# Patient Record
Sex: MALE | HISPANIC OR LATINO | Employment: FULL TIME | URBAN - METROPOLITAN AREA
[De-identification: names, ages, dates, MRNs, and addresses within clinical notes are randomized per-mention and may not be internally consistent; named-entity substitution may affect disease eponyms.]

---

## 2017-08-23 ENCOUNTER — HOSPITAL ENCOUNTER (OUTPATIENT)
Dept: RADIOLOGY | Facility: MEDICAL CENTER | Age: 57
End: 2017-08-23
Attending: PHYSICIAN ASSISTANT
Payer: COMMERCIAL

## 2017-08-23 DIAGNOSIS — R05.9 COUGH: ICD-10-CM

## 2017-08-23 PROCEDURE — 71020 DX-CHEST-2 VIEWS: CPT

## 2018-03-07 ENCOUNTER — OFFICE VISIT (OUTPATIENT)
Dept: CARDIOLOGY | Facility: MEDICAL CENTER | Age: 58
End: 2018-03-07
Payer: COMMERCIAL

## 2018-03-07 VITALS
DIASTOLIC BLOOD PRESSURE: 98 MMHG | OXYGEN SATURATION: 94 % | BODY MASS INDEX: 38.21 KG/M2 | SYSTOLIC BLOOD PRESSURE: 158 MMHG | HEART RATE: 82 BPM | HEIGHT: 69 IN | WEIGHT: 258 LBS

## 2018-03-07 DIAGNOSIS — R07.9 CHEST PAIN, UNSPECIFIED TYPE: ICD-10-CM

## 2018-03-07 DIAGNOSIS — I10 HTN (HYPERTENSION), MALIGNANT: ICD-10-CM

## 2018-03-07 LAB — EKG IMPRESSION: NORMAL

## 2018-03-07 PROCEDURE — 99244 OFF/OP CNSLTJ NEW/EST MOD 40: CPT | Performed by: INTERNAL MEDICINE

## 2018-03-07 PROCEDURE — 93000 ELECTROCARDIOGRAM COMPLETE: CPT | Performed by: INTERNAL MEDICINE

## 2018-03-07 ASSESSMENT — ENCOUNTER SYMPTOMS
BLURRED VISION: 0
COUGH: 0
DEPRESSION: 0
LOSS OF CONSCIOUSNESS: 0
FEVER: 0
SHORTNESS OF BREATH: 0
VOMITING: 0
MYALGIAS: 0
NAUSEA: 0
CHILLS: 0
SENSORY CHANGE: 0
DOUBLE VISION: 0
SPEECH CHANGE: 0
CLAUDICATION: 0
EYE PAIN: 0
HEADACHES: 0
PND: 0
BRUISES/BLEEDS EASILY: 0
BLOOD IN STOOL: 0
ORTHOPNEA: 0
PALPITATIONS: 0
DIZZINESS: 0
EYE DISCHARGE: 0
HALLUCINATIONS: 0
ABDOMINAL PAIN: 0
FALLS: 0
WEIGHT LOSS: 0

## 2018-03-07 NOTE — PROGRESS NOTES
Subjective:   Danielito Marroquin is a 57 y.o. male who presents today for cardiac care and evaluation for chest pain. Patient has chest pain at sporadic times. No specific precipitating factors or worsening factors. Chest pain is described to be pressure-like sensation however localized at anterior chest without radition. Lasting for seconds to minutes. No prior cardiac problems. No prior cardiac workup.    No family history of sudden cardiac death.    Chief Complaint: chest pain.    I have reviewed patient's ECG, which shows normal sinus rhythm, normal OK, QT intervals. No evidence of acute coronary syndrome.    Past Medical History:   Diagnosis Date   • Hypertension      History reviewed. No pertinent surgical history.  Family History   Problem Relation Age of Onset   • Heart Disease Mother      History   Smoking Status   • Never Smoker   Smokeless Tobacco   • Never Used     No Known Allergies  Outpatient Encounter Prescriptions as of 3/7/2018   Medication Sig Dispense Refill   • aspirin EC (ECOTRIN) 81 MG Tablet Delayed Response Take 81 mg by mouth every day.       No facility-administered encounter medications on file as of 3/7/2018.      Review of Systems   Constitutional: Negative for chills, fever, malaise/fatigue and weight loss.   HENT: Negative for ear discharge, ear pain, hearing loss and nosebleeds.    Eyes: Negative for blurred vision, double vision, pain and discharge.   Respiratory: Negative for cough and shortness of breath.    Cardiovascular: Positive for chest pain. Negative for palpitations, orthopnea, claudication, leg swelling and PND.   Gastrointestinal: Negative for abdominal pain, blood in stool, melena, nausea and vomiting.   Genitourinary: Negative for dysuria and hematuria.   Musculoskeletal: Negative for falls, joint pain and myalgias.   Skin: Negative for itching and rash.   Neurological: Negative for dizziness, sensory change, speech change, loss of consciousness and headaches.  "  Endo/Heme/Allergies: Negative for environmental allergies. Does not bruise/bleed easily.   Psychiatric/Behavioral: Negative for depression, hallucinations and suicidal ideas.        Objective:   /98   Pulse 82   Ht 1.753 m (5' 9\")   Wt 117 kg (258 lb)   SpO2 94%   BMI 38.10 kg/m²     Physical Exam   Constitutional: He is oriented to person, place, and time. He appears well-developed and well-nourished.   HENT:   Head: Normocephalic and atraumatic.   Eyes: EOM are normal.   Neck: Normal range of motion. No JVD present.   Cardiovascular: Normal rate, regular rhythm, normal heart sounds and intact distal pulses.  Exam reveals no gallop and no friction rub.    No murmur heard.  Bilateral femoral pulses are 2+, bilateral dorsalis pedis pulses are 2+, bilateral posterior tibialis pulses are 2+.   Pulmonary/Chest: No respiratory distress. He has no wheezes. He has no rales. He exhibits no tenderness.   Abdominal: Soft. Bowel sounds are normal. There is no tenderness. There is no rebound and no guarding.   The is no presence of abdominal bruits   Musculoskeletal: Normal range of motion.   Neurological: He is alert and oriented to person, place, and time.   Skin: Skin is warm and dry.   Psychiatric: He has a normal mood and affect.   Nursing note and vitals reviewed.      Assessment:     1. Chest pain, unspecified type  EKG   2. HTN (hypertension), malignant         Medical Decision Making:  Today's Assessment / Status / Plan:   At this time, to further risk stratify, I will order a transthoracic echocardiogram to assess cardiac and valvular functions. I will also order a treadmill stress test (to avoid radiation exposure in young patients) to assess for coronary ischemia.          "

## 2018-03-14 ENCOUNTER — NON-PROVIDER VISIT (OUTPATIENT)
Dept: CARDIOLOGY | Facility: MEDICAL CENTER | Age: 58
End: 2018-03-14
Payer: COMMERCIAL

## 2018-03-14 VITALS
OXYGEN SATURATION: 97 % | WEIGHT: 258 LBS | DIASTOLIC BLOOD PRESSURE: 98 MMHG | HEART RATE: 70 BPM | HEIGHT: 69 IN | BODY MASS INDEX: 38.21 KG/M2 | SYSTOLIC BLOOD PRESSURE: 158 MMHG

## 2018-03-14 DIAGNOSIS — R07.89 OTHER CHEST PAIN: ICD-10-CM

## 2018-03-14 LAB — TREADMILL STRESS: NORMAL

## 2018-03-14 PROCEDURE — 93015 CV STRESS TEST SUPVJ I&R: CPT | Performed by: INTERNAL MEDICINE

## 2018-03-15 DIAGNOSIS — R07.89 OTHER CHEST PAIN: ICD-10-CM

## 2018-03-15 NOTE — PROGRESS NOTES
Dear Apple,    Can you please let Danieltio Marroquin know that result is ok and I will see patient as scheduled?    Thanks Musa Chiu.

## 2018-03-16 ENCOUNTER — TELEPHONE (OUTPATIENT)
Dept: CARDIOLOGY | Facility: MEDICAL CENTER | Age: 58
End: 2018-03-16

## 2018-03-16 NOTE — LETTER
March 16, 2018        Danielito Marroquin  [address] [facility] [address]          Dear Danielito,    We have received the results of your recent:    Treadmill Stress Test    Your test came back looking OK per Dr. Lindsay.  Please follow up as previously discussed on September 17, 2018 at 10:15am with Dr. Lindsay.     To schedule your echocardiogram that has been ordered, please call Imaging Scheduling at [phone].    Feel free to call us with any questions, [phone].        Sincerely,        [facility]

## 2018-03-16 NOTE — TELEPHONE ENCOUNTER
[facility] Treadmill Stress   Order: [id number]   Status:  Edited Result - FINAL   Visible to patient:  No (Not Released) Dx:  Other chest pain   Notes Recorded by Dayan Lindsay M.D. on 3/14/2018 at 5:19 PM PDT  Dear Apple,    Can you please let Danielito Marroquin know that result is ok and I will see patient as scheduled?    Thanks Musa Chiu.     _____________________________________    Contacted patient, discussed above note.  Patient is requesting this information be mailed to him.      He also is inquiring on echo that has been ordered.  Provided [phone] to patient to schedule study.      Patient verbalizes all understanding.

## 2018-04-05 ENCOUNTER — HOSPITAL ENCOUNTER (OUTPATIENT)
Dept: CARDIOLOGY | Facility: MEDICAL CENTER | Age: 58
End: 2018-04-05
Attending: INTERNAL MEDICINE
Payer: COMMERCIAL

## 2018-04-05 DIAGNOSIS — R07.89 OTHER CHEST PAIN: ICD-10-CM

## 2018-04-05 LAB
LV EJECT FRACT  99904: 50
LV EJECT FRACT MOD 2C 99903: 47.06
LV EJECT FRACT MOD 4C 99902: 54.15
LV EJECT FRACT MOD BP 99901: 51.29

## 2018-04-05 PROCEDURE — 93306 TTE W/DOPPLER COMPLETE: CPT

## 2018-04-05 PROCEDURE — 93306 TTE W/DOPPLER COMPLETE: CPT | Mod: 26 | Performed by: INTERNAL MEDICINE

## 2018-04-06 NOTE — PROGRESS NOTES
Dear Apple,    Can you please let Danielito Marroquin know that result is ok and I will see patient as scheduled?    Thanks Musa Chiu.

## 2018-04-09 ENCOUNTER — TELEPHONE (OUTPATIENT)
Dept: CARDIOLOGY | Facility: MEDICAL CENTER | Age: 58
End: 2018-04-09

## 2018-04-09 NOTE — TELEPHONE ENCOUNTER
Echocardiogram Comp w/o Cont   Order: [id number]   Status:  Final result   Visible to patient:  No (Not Released) Dx:  Other chest pain   Notes Recorded by Dayan Lindsay M.D. on 4/6/2018 at 11:23 AM PDT  Dear Apple,    Can you please let [place]ada know that result is ok and I will see patient as scheduled?    Thanks Musa Chiu.     ____________________________________    Contacted patient, discussed above note.  Reminded patient of F/V on 9/17/18.  Patient verbalizes understanding.

## 2021-01-04 ENCOUNTER — NURSE TRIAGE (OUTPATIENT)
Dept: HEALTH INFORMATION MANAGEMENT | Facility: OTHER | Age: 61
End: 2021-01-04

## 2021-01-04 NOTE — TELEPHONE ENCOUNTER
Pt & wife tested for covid on 12/22 & were +, his symptoms started on 12/20, wife was cleared by health department recently, he wants to be cleared to undergo lab work & x-ray.  His only symptoms was a cough.      He still has cough.      Cleared to get testing done.

## 2021-01-05 ENCOUNTER — HOSPITAL ENCOUNTER (OUTPATIENT)
Dept: RADIOLOGY | Facility: MEDICAL CENTER | Age: 61
End: 2021-01-05
Attending: FAMILY MEDICINE
Payer: COMMERCIAL

## 2021-01-05 ENCOUNTER — HOSPITAL ENCOUNTER (OUTPATIENT)
Dept: LAB | Facility: MEDICAL CENTER | Age: 61
End: 2021-01-05
Attending: FAMILY MEDICINE
Payer: COMMERCIAL

## 2021-01-05 DIAGNOSIS — U07.1 INFECTION DUE TO 2019-NCOV: ICD-10-CM

## 2021-01-05 DIAGNOSIS — J45.30 MILD PERSISTENT ASTHMA, UNCOMPLICATED: ICD-10-CM

## 2021-01-05 LAB
ALBUMIN SERPL BCP-MCNC: 4.1 G/DL (ref 3.2–4.9)
ALBUMIN/GLOB SERPL: 1.3 G/DL
ALP SERPL-CCNC: 59 U/L (ref 30–99)
ALT SERPL-CCNC: 116 U/L (ref 2–50)
ANION GAP SERPL CALC-SCNC: 11 MMOL/L (ref 7–16)
AST SERPL-CCNC: 99 U/L (ref 12–45)
BASOPHILS # BLD AUTO: 0.4 % (ref 0–1.8)
BASOPHILS # BLD: 0.03 K/UL (ref 0–0.12)
BILIRUB SERPL-MCNC: 0.5 MG/DL (ref 0.1–1.5)
BUN SERPL-MCNC: 11 MG/DL (ref 8–22)
CALCIUM SERPL-MCNC: 9.2 MG/DL (ref 8.5–10.5)
CHLORIDE SERPL-SCNC: 104 MMOL/L (ref 96–112)
CHOLEST SERPL-MCNC: 206 MG/DL (ref 100–199)
CO2 SERPL-SCNC: 26 MMOL/L (ref 20–33)
CREAT SERPL-MCNC: 0.78 MG/DL (ref 0.5–1.4)
EOSINOPHIL # BLD AUTO: 0.43 K/UL (ref 0–0.51)
EOSINOPHIL NFR BLD: 5.6 % (ref 0–6.9)
ERYTHROCYTE [DISTWIDTH] IN BLOOD BY AUTOMATED COUNT: 42.7 FL (ref 35.9–50)
FASTING STATUS PATIENT QL REPORTED: NORMAL
GLOBULIN SER CALC-MCNC: 3.1 G/DL (ref 1.9–3.5)
GLUCOSE SERPL-MCNC: 92 MG/DL (ref 65–99)
HCT VFR BLD AUTO: 45.5 % (ref 42–52)
HDLC SERPL-MCNC: 45 MG/DL
HGB BLD-MCNC: 15.3 G/DL (ref 14–18)
IMM GRANULOCYTES # BLD AUTO: 0.04 K/UL (ref 0–0.11)
IMM GRANULOCYTES NFR BLD AUTO: 0.5 % (ref 0–0.9)
LDLC SERPL CALC-MCNC: 141 MG/DL
LYMPHOCYTES # BLD AUTO: 2.7 K/UL (ref 1–4.8)
LYMPHOCYTES NFR BLD: 35.1 % (ref 22–41)
MCH RBC QN AUTO: 28.8 PG (ref 27–33)
MCHC RBC AUTO-ENTMCNC: 33.6 G/DL (ref 33.7–35.3)
MCV RBC AUTO: 85.5 FL (ref 81.4–97.8)
MONOCYTES # BLD AUTO: 0.52 K/UL (ref 0–0.85)
MONOCYTES NFR BLD AUTO: 6.8 % (ref 0–13.4)
NEUTROPHILS # BLD AUTO: 3.98 K/UL (ref 1.82–7.42)
NEUTROPHILS NFR BLD: 51.6 % (ref 44–72)
NRBC # BLD AUTO: 0 K/UL
NRBC BLD-RTO: 0 /100 WBC
PLATELET # BLD AUTO: 277 K/UL (ref 164–446)
PMV BLD AUTO: 10.8 FL (ref 9–12.9)
POTASSIUM SERPL-SCNC: 3.6 MMOL/L (ref 3.6–5.5)
PROT SERPL-MCNC: 7.2 G/DL (ref 6–8.2)
RBC # BLD AUTO: 5.32 M/UL (ref 4.7–6.1)
SODIUM SERPL-SCNC: 141 MMOL/L (ref 135–145)
TRIGL SERPL-MCNC: 99 MG/DL (ref 0–149)
WBC # BLD AUTO: 7.7 K/UL (ref 4.8–10.8)

## 2021-01-05 PROCEDURE — 80053 COMPREHEN METABOLIC PANEL: CPT

## 2021-01-05 PROCEDURE — 71046 X-RAY EXAM CHEST 2 VIEWS: CPT

## 2021-01-05 PROCEDURE — 36415 COLL VENOUS BLD VENIPUNCTURE: CPT

## 2021-01-05 PROCEDURE — 85025 COMPLETE CBC W/AUTO DIFF WBC: CPT

## 2021-01-05 PROCEDURE — 80061 LIPID PANEL: CPT

## 2023-04-20 ENCOUNTER — TELEPHONE (OUTPATIENT)
Dept: CARDIOLOGY | Facility: MEDICAL CENTER | Age: 63
End: 2023-04-20
Payer: COMMERCIAL

## 2023-04-20 NOTE — TELEPHONE ENCOUNTER
Received surgical clearance from [facility] for Colonoscopy  Patient has not been seen within 18 months.     Task sent to schedulers to schedule sooner appt for clearance if available

## 2023-04-25 ENCOUNTER — APPOINTMENT (OUTPATIENT)
Dept: CARDIOLOGY | Facility: MEDICAL CENTER | Age: 63
End: 2023-04-25
Payer: COMMERCIAL

## 2023-04-27 ENCOUNTER — TELEPHONE (OUTPATIENT)
Dept: CARDIOLOGY | Facility: PHYSICIAN GROUP | Age: 63
End: 2023-04-27
Payer: COMMERCIAL

## 2023-04-27 NOTE — TELEPHONE ENCOUNTER
Spoke to patient in regards to obtaining records for NP appointment with [facility]. Per patient has never been treated by a previous cardiologist. Confirmed all recent notes, labs, and cardiac imaging are in Epic. Confirmed with patient appointment time, location, and date.

## 2023-05-01 ENCOUNTER — OFFICE VISIT (OUTPATIENT)
Dept: CARDIOLOGY | Facility: MEDICAL CENTER | Age: 63
End: 2023-05-01
Payer: COMMERCIAL

## 2023-05-01 VITALS
RESPIRATION RATE: 16 BRPM | WEIGHT: 254 LBS | HEART RATE: 78 BPM | SYSTOLIC BLOOD PRESSURE: 130 MMHG | HEIGHT: 69 IN | DIASTOLIC BLOOD PRESSURE: 86 MMHG | OXYGEN SATURATION: 97 % | BODY MASS INDEX: 37.62 KG/M2

## 2023-05-01 DIAGNOSIS — E78.5 DYSLIPIDEMIA: ICD-10-CM

## 2023-05-01 DIAGNOSIS — I51.7 LVH (LEFT VENTRICULAR HYPERTROPHY): ICD-10-CM

## 2023-05-01 DIAGNOSIS — R07.89 OTHER CHEST PAIN: ICD-10-CM

## 2023-05-01 DIAGNOSIS — Z01.810 PREOPERATIVE CARDIOVASCULAR EXAMINATION: ICD-10-CM

## 2023-05-01 DIAGNOSIS — I10 ESSENTIAL HYPERTENSION: ICD-10-CM

## 2023-05-01 DIAGNOSIS — R94.31 ABNORMAL ECG: ICD-10-CM

## 2023-05-01 PROCEDURE — 99244 OFF/OP CNSLTJ NEW/EST MOD 40: CPT | Performed by: INTERNAL MEDICINE

## 2023-05-01 PROCEDURE — 93000 ELECTROCARDIOGRAM COMPLETE: CPT | Performed by: INTERNAL MEDICINE

## 2023-05-01 RX ORDER — LOSARTAN POTASSIUM 25 MG/1
25 TABLET ORAL 2 TIMES DAILY
COMMUNITY
Start: 2023-02-23

## 2023-05-01 RX ORDER — ATORVASTATIN CALCIUM 40 MG/1
TABLET, FILM COATED ORAL
COMMUNITY
Start: 2023-02-23

## 2023-05-01 RX ORDER — POLYETHYLENE GLYCOL-3350 AND ELECTROLYTES 236; 6.74; 5.86; 2.97; 22.74 G/274.31G; G/274.31G; G/274.31G; G/274.31G; G/274.31G
POWDER, FOR SOLUTION ORAL
COMMUNITY
Start: 2023-04-12 | End: 2023-05-01

## 2023-05-01 ASSESSMENT — ENCOUNTER SYMPTOMS
NUMBNESS: 1
BLOATING: 0
PARESTHESIAS: 1
DOUBLE VISION: 0
DYSPNEA ON EXERTION: 0
FLANK PAIN: 0
FALLS: 0
SHORTNESS OF BREATH: 0
EXCESSIVE DAYTIME SLEEPINESS: 0
DECREASED APPETITE: 0
DIARRHEA: 0
SYNCOPE: 0
MYALGIAS: 0
SLEEP DISTURBANCES DUE TO BREATHING: 0
CONSTIPATION: 0
NIGHT SWEATS: 0
PALPITATIONS: 0
FEVER: 0
PND: 0
WEAKNESS: 0
DIZZINESS: 0
HEADACHES: 0
LIGHT-HEADEDNESS: 0
IRREGULAR HEARTBEAT: 0
BACK PAIN: 0
VOMITING: 0
NEAR-SYNCOPE: 0
SORE THROAT: 0
LOSS OF BALANCE: 0
ORTHOPNEA: 0
NAUSEA: 0
BLURRED VISION: 0
COUGH: 0
DIAPHORESIS: 0
WHEEZING: 0

## 2023-05-01 NOTE — PROGRESS NOTES
Cardiology Initial Consultation Note    Date of note:    5/1/2023    Primary Care Provider: Galo Nesbitt M.D.  Referring Provider: Galo Nesbitt M.D.     Patient Name: Danielito Bray     YOB: 1960  MRN:              [id number]    Chief Complaint   Patient presents with    Chest Pain     NP Dx: Chest pain, unspecified       History of Present Illness: Mr. Danielito Marroquin is a 62 y.o. male whose current medical problems include hypertension, dyslipidemia and prediabetes who is here for cardiac consultation for chest pain and preoperative cardiovascular evaluation.    Patient states that he was at his [employer] last Sunday and had elevated BP in the 200s.  Has been checking his BP at home which has been labile.  Was diagnosed last year.  Was initially on losartan once daily, is now on twice daily.  Goes to [place] for work and will stay 2-3 weeks.  Has gained weight due to dietary indiscretion.    Has occasional chest pain, left sided, pressure like.  Usually with exertion.  At times, will get numbness in bilateral arms.    In terms of physical activity, is a .  Physically demanding job.  Symptoms usually are when working.    Cardiovascular Risk Factors:  1. Smoking status: Former smoker  2. Type II Diabetes Mellitus: Prediabetes  Lab Results   Component Value Date/Time    HBA1C 6.1 (H) 03/02/2016 01:21 PM     3. Hypertension: Yes  4. Dyslipidemia: Yes  Cholesterol,Tot   Date Value Ref Range Status   01/05/2021 206 (H) 100 - 199 mg/dL Final     LDL   Date Value Ref Range Status   01/05/2021 141 (H) <100 mg/dL Final     HDL   Date Value Ref Range Status   01/05/2021 45 >=40 mg/dL Final     Triglycerides   Date Value Ref Range Status   01/05/2021 99 0 - 149 mg/dL Final     5. Family history of early Coronary Artery Disease in a first degree relative (Male less than 55 years of age; Female less than 65 years of age): Denies  6.  Obesity and/or Metabolic Syndrome: BMI  37.5  7. Sedentary lifestyle: No      Review of Systems   Constitutional: Negative for decreased appetite, diaphoresis, fever, malaise/fatigue and night sweats.   HENT:  Negative for congestion and sore throat.    Eyes:  Negative for blurred vision and double vision.   Cardiovascular:  Positive for chest pain. Negative for cyanosis, dyspnea on exertion, irregular heartbeat, leg swelling, near-syncope, orthopnea, palpitations, paroxysmal nocturnal dyspnea and syncope.   Respiratory:  Negative for cough, shortness of breath, sleep disturbances due to breathing and wheezing.    Endocrine: Negative for cold intolerance and heat intolerance.   Musculoskeletal:  Negative for back pain, falls and myalgias.   Gastrointestinal:  Negative for bloating, constipation, diarrhea, nausea and vomiting.   Genitourinary:  Negative for dysuria and flank pain.   Neurological:  Positive for numbness and paresthesias. Negative for excessive daytime sleepiness, dizziness, headaches, light-headedness, loss of balance and weakness.       Past Medical History:   Diagnosis Date    Hypertension          History reviewed. No pertinent surgical history.      Current Outpatient Medications   Medication Sig Dispense Refill    atorvastatin (LIPITOR) 40 MG Tab TAKE 1 TABLET BY MOUTH ONCE DAILY FOR CHOLESTEROL      losartan (COZAAR) 25 MG Tab Take 25 mg by mouth 2 times a day.      aspirin EC (ECOTRIN) 81 MG Tablet Delayed Response Take 81 mg by mouth every day.       No current facility-administered medications for this visit.         No Known Allergies      Family History   Problem Relation Age of Onset    Heart Disease Mother          Social History     Socioeconomic History    Marital status:      Spouse name: Not on file    Number of children: Not on file    Years of education: Not on file    Highest education level: Not on file   Occupational History    Not on file   Tobacco Use    Smoking status: Former     Types: Cigarettes     "Smokeless tobacco: Never   Substance and Sexual Activity    Alcohol use: No    Drug use: No    Sexual activity: Not on file   Other Topics Concern    Not on file   Social History Narrative    Not on file     Social Determinants of Health     Financial Resource Strain: Not on file   Food Insecurity: Not on file   Transportation Needs: Not on file   Physical Activity: Not on file   Stress: Not on file   Social Connections: Not on file   Intimate Partner Violence: Not on file   Housing Stability: Not on file         Physical Exam:  Ambulatory Vitals  /86 (BP Location: Left arm, Patient Position: Sitting, BP Cuff Size: Adult)   Pulse 78   Resp 16   Ht 1.753 m (5' 9\")   Wt 115 kg (254 lb)   SpO2 97%    Oxygen Therapy:  Pulse Oximetry: 97 %  BP Readings from Last 4 Encounters:   05/01/23 130/86   03/14/18 158/98   03/07/18 158/98       Weight/BMI: Body mass index is 37.51 kg/m².  Wt Readings from Last 4 Encounters:   05/01/23 115 kg (254 lb)   03/14/18 117 kg (258 lb)   03/07/18 117 kg (258 lb)       General: Well appearing and in no apparent distress  Eyes: nl conjunctiva, no icteric sclera  ENT: wearing a mask, normal external appearance of ears  Neck: no visible JVP,  no carotid bruits  Lungs: normal respiratory effort, CTAB  Heart: RRR, no murmurs, no rubs or gallops,  no edema bilateral lower extremities. No LV/RV heave on cardiac palpatation. 2+ bilateral radial pulses.    Abdomen: soft, non tender, non distended, no masses, normal bowel sounds.  No HSM.  Extremities/MSK: no clubbing, no cyanosis  Neurological: No focal sensory deficits  Psychiatric: Appropriate affect, A/O x 3, intact judgement and insight  Skin: Warm extremities      Lab Data Review:  Lab Results   Component Value Date/Time    CHOLSTRLTOT 206 (H) 01/05/2021 09:52 AM     (H) 01/05/2021 09:52 AM    HDL 45 01/05/2021 09:52 AM    TRIGLYCERIDE 99 01/05/2021 09:52 AM       Lab Results   Component Value Date/Time    SODIUM 141 " 01/05/2021 09:52 AM    POTASSIUM 3.6 01/05/2021 09:52 AM    CHLORIDE 104 01/05/2021 09:52 AM    CO2 26 01/05/2021 09:52 AM    GLUCOSE 92 01/05/2021 09:52 AM    BUN 11 01/05/2021 09:52 AM    CREATININE 0.78 01/05/2021 09:52 AM     Lab Results   Component Value Date/Time    ALKPHOSPHAT 59 01/05/2021 09:52 AM    ASTSGOT 99 (H) 01/05/2021 09:52 AM    ALTSGPT 116 (H) 01/05/2021 09:52 AM    TBILIRUBIN 0.5 01/05/2021 09:52 AM      Lab Results   Component Value Date/Time    WBC 7.7 01/05/2021 09:52 AM     Lab Results   Component Value Date/Time    HBA1C 6.1 (H) 03/02/2016 01:21 PM         Cardiac Imaging and Procedures Review:    EKG dated 5/1/2023: My personal interpretation is sinus rhythm, abnormal T wave inversions in anterolateral leads and inferior leads    EKG dated 3/7/2018: My personal interpretation is sinus rhythm with normal T waves in inferior leads    Echo dated 4/5/2018, personally interpreted:   Low normal left ventricle systolic function, EF 50%  Moderate concentric LVH  Mildly dilated RV  No valve disease    Echo dated 3/7/2016:  Normal left ventricular systolic function with vigorous global   contractility.  Left ventricular ejection fraction is visually estimated to be 70%.  Moderate concentric left ventricular hypertrophy.  Increased velocity through the LV outflow track and aortic valve due to   hyperdynamic contractility; aortic valve opens normally.  Right heart pressures are consistent with moderate pulmonary   hypertension.  No significant valve abnormalities.    Exercise stress testing (3/14/2018):   Adequate exercise capacity   No evidence of ischemic ECG changes at peak stress   At peak stress, rhythm is sinus tachycardia   No arrhythmia noted.     Radiology test Review:  CXR: No cardiomegaly        Assessment & Plan     1. Other chest pain  EKG    EC-ECHOCARDIOGRAM COMPLETE W/O CONT    NM-CARDIAC TREADMILL ONLY-NO IMAGING      2. Preoperative cardiovascular examination  EKG    EC-ECHOCARDIOGRAM  COMPLETE W/O CONT    NM-CARDIAC TREADMILL ONLY-NO IMAGING      3. Essential hypertension  losartan (COZAAR) 25 MG Tab      4. Dyslipidemia  atorvastatin (LIPITOR) 40 MG Tab    Lipid Profile      5. LVH (left ventricular hypertrophy)  EC-ECHOCARDIOGRAM COMPLETE W/O CONT      6. Abnormal ECG  EC-ECHOCARDIOGRAM COMPLETE W/O CONT    NM-CARDIAC TREADMILL ONLY-NO IMAGING            Shared Medical Decision Making:  Patient has been experiencing chest pain with exertion with typical features.  EKG obtained today shows diffuse T wave inversions concerning for ischemia.  He is certainly intermediate risk for CAD with metabolic syndrome, hypertension, dyslipidemia and ongoing symptoms.  Is currently scheduled for colonoscopy end of May and presents today for cardiovascular evaluation.    Obtain exercise treadmill stress test to rule out underlying ischemia given abnormal EKG and ongoing symptoms.    Previous echocardiogram showed moderate LVH.  With abnormal EKG and preoperative evaluation, obtain echo to evaluate underlying cardiac structure and function.  We also discussed potential concerns for HCM with moderate LVH and young age.    Blood pressure better controlled on losartan 25 mg twice daily.  Does forget to take evening dose.  We discussed taking losartan 50 mg daily rather than twice daily.    Recently started on Lipitor 40 mg daily.  Repeat lipid panel to monitor LDL.  We discussed goal LDL less than 70.    Discussed the importance of lifestyle modifications to lower CVD risk. This includes eating a heart-healthy Mediterranean diet (avoid processed, frozen, pre-packed meals), regular cardio focused exercise where your heart rate is reaching 85% of your maximum predicted heart rate (calculated as 220 minus your age) for a minimum of 150 minutes/week, maintenance of normal BMI, and avoidance of tobacco products.    I have independently interpreted test results and discussed results and management with the  patient.    All of patient's excellent questions were answered to the best of my knowledge and to his satisfaction.  It was a pleasure seeing Mr. Danielito Marroquin in my clinic today. Return in about 1 year (around 5/1/2024). Patient is aware to call the cardiology clinic with any questions or concerns.      Misha Kathleen MD  [facility] Vascular [facility], [facility]  [address] [address]  Phone: [phone]  Fax: [phone]    Please note that this dictation was created using voice recognition software. I have made every reasonable attempt to correct obvious errors, but it is possible there are errors of grammar and possibly content that I did not discover before finalizing the note.

## 2023-05-02 ENCOUNTER — HOSPITAL ENCOUNTER (OUTPATIENT)
Dept: CARDIOLOGY | Facility: MEDICAL CENTER | Age: 63
End: 2023-05-02
Attending: INTERNAL MEDICINE
Payer: COMMERCIAL

## 2023-05-02 DIAGNOSIS — R94.31 ABNORMAL ECG: ICD-10-CM

## 2023-05-02 DIAGNOSIS — Z01.810 PREOPERATIVE CARDIOVASCULAR EXAMINATION: ICD-10-CM

## 2023-05-02 DIAGNOSIS — R07.89 OTHER CHEST PAIN: ICD-10-CM

## 2023-05-02 DIAGNOSIS — I51.7 LVH (LEFT VENTRICULAR HYPERTROPHY): ICD-10-CM

## 2023-05-02 LAB — EKG IMPRESSION: NORMAL

## 2023-05-02 PROCEDURE — 93306 TTE W/DOPPLER COMPLETE: CPT

## 2023-05-03 LAB
LV EJECT FRACT  99904: 56
LV EJECT FRACT MOD 2C 99903: 56.54
LV EJECT FRACT MOD 4C 99902: 53.29
LV EJECT FRACT MOD BP 99901: 56.15

## 2023-05-03 PROCEDURE — 93306 TTE W/DOPPLER COMPLETE: CPT | Mod: 26 | Performed by: INTERNAL MEDICINE

## 2023-05-05 ENCOUNTER — TELEPHONE (OUTPATIENT)
Dept: CARDIOLOGY | Facility: MEDICAL CENTER | Age: 63
End: 2023-05-05
Payer: COMMERCIAL

## 2023-05-05 NOTE — TELEPHONE ENCOUNTER
PRABHJOT    Caller: OMARI Romero    Office Name, phone number, fax number:     [facility]  P: [phone]  F: [phone]    Fax clearance to [phone]    Procedure Name: COLONOSCOPY    Procedure Scheduled Date: 05/26/2023      **CLEARANCE IN PT MEDIA**    Callback Number: [phone]

## 2023-05-05 NOTE — LETTER
Date: 5/11/2023     Dear Surgeon or Proceduralist,      Thank you for your request for cardiac stratification of our mutual patient Danielito Marroquin 1960. We have reviewed their [facility] records; and to the best of our understanding this patient has not had stenting, ablation, cardiothoracic surgery or hospitalization for cardiovascular reasons in the past 6 months.  Danielito Marroquin has been seen within the past 18 months and is considered to have non-modifiable cardiac risk for this low-risk procedure/surgery. They may proceed from a cardiovascular standpoint and may hold their antiplatelet/anticoagulation as briefly as possible. Please have patient resume this medication when hemodynamically stable to do so.     Aspirin or Prasugrel   - hold 7 days prior to procedure/surgery, resume when hemodynamically stable      Clopidrogrel or Ticagrelor  - hold 7 days for all neurological procedures, hold 5 days prior to all other procedure/surgery,  resume when hemodynamically stable     Warfarin - hold 7 days for all neurological procedures, hold 5 days prior to all other procedure/surgery and coordinate with [facility]tico[facility] ([phone]) INR testing and dose management.      Pradaxa/Xarelto/Eliquis/Savesya - hold 1 day prior to procedure for low bleeding risk procedure, 2 days for high bleeding risk procedure, or consider holding 3 days or longer for patients with reduced kidney function (CrCl <30mL/min) or spinal/cranial surgeries/procedures.      If they have a mechanical heart valve, please coordinate with [facility]ticoagulation [facility] ([phone]) the proper management of their anticoagulant in the periprocedural or perioperative period.      Some patients have higher risk for cardiovascular complications or holding medication. If our patient has had prior complications of holding antiplatelet or anticoagulants in the past and we have seen them after these events, we have addressed  these concerns with the patient. They are at an unknown degree of increased risk for recurrent complication.  You may hold anticoagulation/antiplatelets for the procedure or surgery if the benefits of the procedure or surgery outweigh this nonmodifiable risk.      If Danielito Marroquin 1960 has new symptoms of heart failure decompensation, unstable arrythmia, or angina please reach out and we will assess the patient.      If you have other patient-specific concerns, please feel free to reach out to the patient's cardiologist directly at [phone].     Thank you,       [facility]

## 2023-05-09 ENCOUNTER — APPOINTMENT (OUTPATIENT)
Dept: RADIOLOGY | Facility: MEDICAL CENTER | Age: 63
End: 2023-05-09
Attending: INTERNAL MEDICINE
Payer: COMMERCIAL

## 2023-05-10 ENCOUNTER — HOSPITAL ENCOUNTER (OUTPATIENT)
Dept: RADIOLOGY | Facility: MEDICAL CENTER | Age: 63
End: 2023-05-10
Attending: INTERNAL MEDICINE
Payer: COMMERCIAL

## 2023-05-10 DIAGNOSIS — R94.31 ABNORMAL ECG: ICD-10-CM

## 2023-05-10 DIAGNOSIS — R07.89 OTHER CHEST PAIN: ICD-10-CM

## 2023-05-10 DIAGNOSIS — Z01.810 PREOPERATIVE CARDIOVASCULAR EXAMINATION: ICD-10-CM

## 2023-05-10 PROCEDURE — 93017 CV STRESS TEST TRACING ONLY: CPT

## 2023-05-11 ENCOUNTER — TELEPHONE (OUTPATIENT)
Dept: CARDIOLOGY | Facility: MEDICAL CENTER | Age: 63
End: 2023-05-11

## 2023-05-11 PROCEDURE — 93018 CV STRESS TEST I&R ONLY: CPT | Performed by: INTERNAL MEDICINE

## 2023-05-11 NOTE — TELEPHONE ENCOUNTER
Last OV: 5/1/2023  Proposed Surgery: Colonoscopy  Surgery Date: 5/26/2023  Requesting Office Name: [facility]  Fax Number: F: [phone]  Preference of Location (default is surgery center unless specified by Cardiologist or [facility])  Prior Clearance Addressed: Yes        Anticoags/Antiplatelets: Aspirin  Outstanding Cardiac Imaging : No  Stent, Cardiac Devices, or Catheterization: No  Ablation, TAVR, Cardioversion: No  Recent Cardiac Hospitalization: No            When: N/A  History (cardiac history):   Past Medical History:   Diagnosis Date    Hypertension              Surgical Clearance Letter Sent: YES   **Scan clearance request letter into [facility].**

## 2023-11-16 ENCOUNTER — OFFICE VISIT (OUTPATIENT)
Dept: OPHTHALMOLOGY | Facility: MEDICAL CENTER | Age: 63
End: 2023-11-16
Payer: COMMERCIAL

## 2023-11-16 DIAGNOSIS — I86.8 ORBITAL VARIX: ICD-10-CM

## 2023-11-16 PROCEDURE — 99204 OFFICE O/P NEW MOD 45 MIN: CPT | Mod: 25 | Performed by: OPHTHALMOLOGY

## 2023-11-16 PROCEDURE — 92250 FUNDUS PHOTOGRAPHY W/I&R: CPT | Performed by: OPHTHALMOLOGY

## 2023-11-16 RX ORDER — ALBUTEROL SULFATE 90 UG/1
AEROSOL, METERED RESPIRATORY (INHALATION)
COMMUNITY
Start: 2023-11-02

## 2023-11-16 ASSESSMENT — TONOMETRY
IOP_METHOD: I-CARE
OD_IOP_MMHG: 12
OS_IOP_MMHG: 14

## 2023-11-16 ASSESSMENT — CONF VISUAL FIELD
OS_SUPERIOR_TEMPORAL_RESTRICTION: 0
OS_INFERIOR_TEMPORAL_RESTRICTION: 0
OS_NORMAL: 1
OD_NORMAL: 1
OS_SUPERIOR_NASAL_RESTRICTION: 0
OS_INFERIOR_NASAL_RESTRICTION: 0
OD_SUPERIOR_NASAL_RESTRICTION: 0
OD_INFERIOR_TEMPORAL_RESTRICTION: 0
OD_INFERIOR_NASAL_RESTRICTION: 0
OD_SUPERIOR_TEMPORAL_RESTRICTION: 0

## 2023-11-16 ASSESSMENT — REFRACTION_MANIFEST
OD_AXIS: 003
OS_CYLINDER: +1.00
OD_CYLINDER: +1.50
METHOD_AUTOREFRACTION: 1
OD_SPHERE: +0.25
OS_SPHERE: +0.25
OS_AXIS: 175

## 2023-11-16 ASSESSMENT — REFRACTION_WEARINGRX
SPECS_TYPE: BIFOCAL
OS_AXIS: 177
OS_SPHERE: +0.25
OD_AXIS: 016
OD_ADD: +2.50
OS_CYLINDER: +0.75
OD_CYLINDER: +0.50
OS_ADD: +2.25
OD_SPHERE: +0.00

## 2023-11-16 ASSESSMENT — SLIT LAMP EXAM - LIDS
COMMENTS: NORMAL
COMMENTS: NORMAL

## 2023-11-16 ASSESSMENT — ENCOUNTER SYMPTOMS
BLURRED VISION: 1
EYE PAIN: 1

## 2023-11-16 ASSESSMENT — EXTERNAL EXAM - LEFT EYE: OS_EXAM: NORMAL

## 2023-11-16 ASSESSMENT — VISUAL ACUITY
OS_CC: 20/20
OD_CC: 20/200
METHOD: SNELLEN - LINEAR
CORRECTION_TYPE: GLASSES

## 2023-11-16 ASSESSMENT — EXTERNAL EXAM - RIGHT EYE: OD_EXAM: NORMAL

## 2023-11-16 NOTE — ASSESSMENT & PLAN NOTE
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  He had been a prior patient of Dr. Sommer but unfortunately those records are not available.  The latest record I have is dated September 2001.  At that time he was seen by Dr. Jatinder Orta at [facility].  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  The year prior he had undergone embolization of the varix with glue.  Initially his visual acuity postprocedure was 20/20 in the right eye.  Patient states that over the years the acuity has slowly decreased.  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  Valsalva he is able to proptosis the right eye.  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  That 1 could consider repeat angiogram to determine whether more the varix could be embolized but this would not necessarily improve his acuity.  He states that his at this point he would rather continue to be monitored and if there was evidence of progressive decrease from this stage then consideration would be for reimaging.  I therefore discussed seeing him back in 6 months. I also stressed the importance of not proptosing in the eye voluntarily as this could cause more ischemic changes to the nerve

## 2023-11-16 NOTE — PROGRESS NOTES
Peds/Neuro Ophthalmology:   Eugene Hdez M.D.    Date & Time note created:    11/16/2023   1:19 PM     Referring MD / APRN:  Galo Nesbitt M.D., No att. providers found    Patient ID:  Name:             Danielito Bray     YOB: 1960  Age:                 63 y.o.  male   MRN:               [id number]    Chief Complaint/Reason for Visit:     Other (New pt for optic nerve eval )      History of Present Illness:    Danielito Marroquin is a 63 y.o. male   New pt for optic nerve eval. Pt states that he gets eye pain around once a month in the right when he's working really hard. Pt states he has been having gradual decreases in vision in the right eye, but the left eye is stable. Pt says there's no other current symptoms.         Review of Systems:  Review of Systems   Eyes:  Positive for blurred vision and pain.        Optic nerve eval    All other systems reviewed and are negative.      Past Medical History:   Past Medical History:   Diagnosis Date    Hypertension        Past Surgical History:  History reviewed. No pertinent surgical history.    Current Outpatient Medications:  Current Outpatient Medications   Medication Sig Dispense Refill    VENTOLIN  (90 Base) MCG/ACT Aero Soln inhalation aerosol INHALE 1 PUFF BY MOUTH EVERY 4 HOURS AS NEEDED FOR wheezing OR SHORTNESS OF BREATH      atorvastatin (LIPITOR) 40 MG Tab TAKE 1 TABLET BY MOUTH ONCE DAILY FOR CHOLESTEROL      losartan (COZAAR) 25 MG Tab Take 25 mg by mouth 2 times a day.      aspirin EC (ECOTRIN) 81 MG Tablet Delayed Response Take 81 mg by mouth every day.       No current facility-administered medications for this visit.       Allergies:  No Known Allergies    Family History:  Family History   Problem Relation Age of Onset    Heart Disease Mother        Social History:  Social History     Socioeconomic History    Marital status:      Spouse name: Not on file    Number of children: Not on file    Years  of education: Not on file    Highest education level: Not on file   Occupational History    Not on file   Tobacco Use    Smoking status: Former     Types: Cigarettes    Smokeless tobacco: Never   Substance and Sexual Activity    Alcohol use: No    Drug use: No    Sexual activity: Not on file   Other Topics Concern    Not on file   Social History Narrative    Not on file     Social Determinants of Health     Financial Resource Strain: Not on file   Food Insecurity: Not on file   Transportation Needs: Not on file   Physical Activity: Not on file   Stress: Not on file   Social Connections: Not on file   Intimate Partner Violence: Not on file   Housing Stability: Not on file          Physical Exam:  Physical Exam    Oriented x 3  Weight/BMI: There is no height or weight on file to calculate BMI.  There were no vitals taken for this visit.    Base Eye Exam       Visual Acuity (Snellen - Linear)         Right Left    Dist cc 20/200 20/20      Correction: Glasses              Tonometry (i[facility]care, 9:19 AM)         Right Left    Pressure 12 14              Visual Fields         Right Left     Full Full              Extraocular Movement         Right Left     Full, Ortho Full, Ortho              Neuro/Psych       Oriented x3: Yes    Mood/Affect: Normal                  Additional Tests       Color         Right Left    Ishihara 0/9 1/9              Stereo       Fly: -    Animals: 0/3    Circles: 0/9                  Slit Lamp and Fundus Exam       External Exam         Right Left    External Normal Normal              Slit Lamp Exam         Right Left    Lids/Lashes Normal Normal    Conjunctiva/Sclera White and quiet White and quiet    Cornea Clear Clear    Anterior Chamber Deep and quiet Deep and quiet    Iris Round and reactive Round and reactive    Lens Clear Clear    Vitreous Normal Normal              Fundus Exam         Right Left    Disc Optic disc atrophy Normal    Macula Normal Normal    Vessels Normal Normal     Periphery Normal Normal                  Refraction       Wearing Rx         Sphere Cylinder Axis Add    Right +0.00 +0.50 016 +2.50    Left +0.25 +0.75 177 +2.25      Age: 3m    Type: Bifocal              Manifest Refraction (Auto)         Sphere Cylinder Axis    Right +0.25 +1.50 003    Left +0.25 +1.00 175                    Pertinent Lab/Test/Imaging Review:      Assessment and Plan:     Orbital varix  11/16/2023-very complex patient referred by [facility] for 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  He had been a prior patient of Dr. Sommer but unfortunately those records are not available.  The latest record I have is dated September 2001.  At that time he was seen by Dr. Jatinder Orta at [facility].  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  The year prior he had undergone embolization of the varix with glue.  Initially his visual acuity postprocedure was 20/20 in the right eye.  Patient states that over the years the acuity has slowly decreased.  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  Valsalva he is able to proptosis the right eye.  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  That 1 could consider repeat angiogram to determine whether more the varix could be embolized but this would not necessarily improve his acuity.  He states that his at this point he would rather continue to be monitored and if there was evidence of progressive decrease from this stage then consideration would be for reimaging.  I therefore discussed seeing him back in 6 months. I also stressed the importance of not proptosing in the eye voluntarily as this could cause more ischemic  changes to the nerve        Eugene Hdez M.D.

## 2023-11-17 ENCOUNTER — HOSPITAL ENCOUNTER (OUTPATIENT)
Dept: RADIOLOGY | Facility: MEDICAL CENTER | Age: 63
End: 2023-11-17
Attending: FAMILY MEDICINE
Payer: COMMERCIAL

## 2023-11-17 DIAGNOSIS — R10.11 POSTPRANDIAL RUQ PAIN: ICD-10-CM

## 2024-05-22 ENCOUNTER — APPOINTMENT (OUTPATIENT)
Dept: OPHTHALMOLOGY | Facility: MEDICAL CENTER | Age: 64
End: 2024-05-22
Payer: COMMERCIAL

## 2024-07-26 ENCOUNTER — HOSPITAL ENCOUNTER (OUTPATIENT)
Dept: LAB | Facility: MEDICAL CENTER | Age: 64
End: 2024-07-26
Attending: FAMILY MEDICINE
Payer: COMMERCIAL

## 2024-07-26 LAB
ALBUMIN SERPL BCP-MCNC: 4 G/DL (ref 3.2–4.9)
ALBUMIN/GLOB SERPL: 1.3 G/DL
ALP SERPL-CCNC: 79 U/L (ref 30–99)
ALT SERPL-CCNC: 35 U/L (ref 2–50)
ANION GAP SERPL CALC-SCNC: 13 MMOL/L (ref 7–16)
ANISOCYTOSIS BLD QL SMEAR: ABNORMAL
AST SERPL-CCNC: 42 U/L (ref 12–45)
BASOPHILS # BLD AUTO: 0.9 % (ref 0–1.8)
BASOPHILS # BLD: 0.09 K/UL (ref 0–0.12)
BILIRUB SERPL-MCNC: 0.3 MG/DL (ref 0.1–1.5)
BUN SERPL-MCNC: 11 MG/DL (ref 8–22)
CALCIUM ALBUM COR SERPL-MCNC: 9.2 MG/DL (ref 8.5–10.5)
CALCIUM SERPL-MCNC: 9.2 MG/DL (ref 8.5–10.5)
CHLORIDE SERPL-SCNC: 103 MMOL/L (ref 96–112)
CHOLEST SERPL-MCNC: 211 MG/DL (ref 100–199)
CO2 SERPL-SCNC: 25 MMOL/L (ref 20–33)
COMMENT 1642: NORMAL
CREAT SERPL-MCNC: 0.94 MG/DL (ref 0.5–1.4)
EOSINOPHIL # BLD AUTO: 0.53 K/UL (ref 0–0.51)
EOSINOPHIL NFR BLD: 5.2 % (ref 0–6.9)
ERYTHROCYTE [DISTWIDTH] IN BLOOD BY AUTOMATED COUNT: 46.5 FL (ref 35.9–50)
GFR SERPLBLD CREATININE-BSD FMLA CKD-EPI: 92 ML/MIN/1.73 M 2
GLOBULIN SER CALC-MCNC: 3.1 G/DL (ref 1.9–3.5)
GLUCOSE SERPL-MCNC: 101 MG/DL (ref 65–99)
HCT VFR BLD AUTO: 37.8 % (ref 42–52)
HDLC SERPL-MCNC: 41 MG/DL
HGB BLD-MCNC: 11 G/DL (ref 14–18)
IMM GRANULOCYTES # BLD AUTO: 0.04 K/UL (ref 0–0.11)
IMM GRANULOCYTES NFR BLD AUTO: 0.4 % (ref 0–0.9)
LDLC SERPL CALC-MCNC: ABNORMAL MG/DL
LYMPHOCYTES # BLD AUTO: 3.23 K/UL (ref 1–4.8)
LYMPHOCYTES NFR BLD: 31.6 % (ref 22–41)
MCH RBC QN AUTO: 20.7 PG (ref 27–33)
MCHC RBC AUTO-ENTMCNC: 29.1 G/DL (ref 32.3–36.5)
MCV RBC AUTO: 71.1 FL (ref 81.4–97.8)
MICROCYTES BLD QL SMEAR: ABNORMAL
MONOCYTES # BLD AUTO: 0.86 K/UL (ref 0–0.85)
MONOCYTES NFR BLD AUTO: 8.4 % (ref 0–13.4)
MORPHOLOGY BLD-IMP: NORMAL
NEUTROPHILS # BLD AUTO: 5.47 K/UL (ref 1.82–7.42)
NEUTROPHILS NFR BLD: 53.5 % (ref 44–72)
NRBC # BLD AUTO: 0 K/UL
NRBC BLD-RTO: 0 /100 WBC (ref 0–0.2)
OVALOCYTES BLD QL SMEAR: NORMAL
PLATELET # BLD AUTO: 364 K/UL (ref 164–446)
PLATELET BLD QL SMEAR: NORMAL
PMV BLD AUTO: 10.2 FL (ref 9–12.9)
POIKILOCYTOSIS BLD QL SMEAR: NORMAL
POTASSIUM SERPL-SCNC: 3.9 MMOL/L (ref 3.6–5.5)
PROT SERPL-MCNC: 7.1 G/DL (ref 6–8.2)
RBC # BLD AUTO: 5.32 M/UL (ref 4.7–6.1)
RBC BLD AUTO: PRESENT
SODIUM SERPL-SCNC: 141 MMOL/L (ref 135–145)
TRIGL SERPL-MCNC: 436 MG/DL (ref 0–149)
TSH SERPL-ACNC: 3.94 UIU/ML (ref 0.35–5.5)
WBC # BLD AUTO: 10.2 K/UL (ref 4.8–10.8)

## 2024-07-26 PROCEDURE — 36415 COLL VENOUS BLD VENIPUNCTURE: CPT

## 2024-07-26 PROCEDURE — 80053 COMPREHEN METABOLIC PANEL: CPT

## 2024-07-26 PROCEDURE — 80061 LIPID PANEL: CPT

## 2024-07-26 PROCEDURE — 84443 ASSAY THYROID STIM HORMONE: CPT

## 2024-07-26 PROCEDURE — 83721 ASSAY OF BLOOD LIPOPROTEIN: CPT

## 2024-07-26 PROCEDURE — 85025 COMPLETE CBC W/AUTO DIFF WBC: CPT

## 2024-07-30 LAB — LDLC SERPL-MCNC: 127 MG/DL (ref 0–129)

## 2024-08-08 ENCOUNTER — OFFICE VISIT (OUTPATIENT)
Dept: CARDIOLOGY | Facility: MEDICAL CENTER | Age: 64
End: 2024-08-08
Attending: INTERNAL MEDICINE
Payer: COMMERCIAL

## 2024-08-08 VITALS
HEART RATE: 79 BPM | OXYGEN SATURATION: 96 % | WEIGHT: 281 LBS | HEIGHT: 69 IN | SYSTOLIC BLOOD PRESSURE: 128 MMHG | DIASTOLIC BLOOD PRESSURE: 76 MMHG | RESPIRATION RATE: 18 BRPM | BODY MASS INDEX: 41.62 KG/M2

## 2024-08-08 DIAGNOSIS — E78.2 MIXED HYPERLIPIDEMIA: ICD-10-CM

## 2024-08-08 DIAGNOSIS — I10 PRIMARY HYPERTENSION: ICD-10-CM

## 2024-08-08 DIAGNOSIS — R07.89 MUSCULOSKELETAL CHEST PAIN: ICD-10-CM

## 2024-08-08 DIAGNOSIS — E78.1 HYPERTRIGLYCERIDEMIA: ICD-10-CM

## 2024-08-08 PROCEDURE — 3074F SYST BP LT 130 MM HG: CPT | Performed by: INTERNAL MEDICINE

## 2024-08-08 PROCEDURE — 99214 OFFICE O/P EST MOD 30 MIN: CPT | Performed by: INTERNAL MEDICINE

## 2024-08-08 PROCEDURE — 3078F DIAST BP <80 MM HG: CPT | Performed by: INTERNAL MEDICINE

## 2024-08-08 PROCEDURE — 99211 OFF/OP EST MAY X REQ PHY/QHP: CPT | Performed by: INTERNAL MEDICINE

## 2024-08-08 RX ORDER — ATORVASTATIN CALCIUM 40 MG/1
40 TABLET, FILM COATED ORAL DAILY
Qty: 90 TABLET | Refills: 3 | Status: SHIPPED | OUTPATIENT
Start: 2024-08-08

## 2024-08-08 ASSESSMENT — ENCOUNTER SYMPTOMS
PND: 0
PALPITATIONS: 0
LOSS OF CONSCIOUSNESS: 0
ORTHOPNEA: 0
COUGH: 0
SHORTNESS OF BREATH: 0
DIZZINESS: 0
MYALGIAS: 0

## 2024-08-08 ASSESSMENT — FIBROSIS 4 INDEX: FIB4 SCORE: 1.21

## 2024-08-08 NOTE — PROGRESS NOTES
Cardiology Initial Consultation Note    Date of note:    8/8/2024    Primary Care Provider: Galo Nesbitt M.D.  Referring Provider: No ref. provider found    Patient Name: Danielito Bray     YOB: 1960  MRN:              [id number]    Chief Complaint   Patient presents with    Hypertension    Chest Pain       History of Present Illness: Mr. Danielito Marroquin is a 64-year-old man with past medical history significant for hypertension, dyslipidemia, obesity, prediabetes presents to the cardiology office for follow-up.    He is an established patient of our practice and was previously seen by Dr. Kathleen.  Last seen in the office on 5/1/2023 for preoperative risk assessment.  He had a treadmill stress test performed which showed no evidence of ischemia.  Echocardiogram revealed normal LV systolic function with an EF of 56%.    He presents today for routine follow-up.  He tells me that his blood pressure has generally been controlled at home.  Over the past 1 week, he has been experiencing musculoskeletal chest pain.  Pain is left side of his chest.  Near the sternum.  It is tender to touch.  Pain is not exertional or radiating.    In addition, he states that he has not been taking his statin as prescribed.  Stopped taking it for approximately 1 year.  He had a lipid panel performed recently which shows an LDL of 127.  Triglycerides were markedly elevated greater than 400.      Cardiovascular Risk Factors:  1. Smoking status: Former smoker  2. Type II Diabetes Mellitus: Prediabetes   Lab Results   Component Value Date/Time    HBA1C 6.1 (H) 03/02/2016 01:21 PM     3. Hypertension: Yes  4. Dyslipidemia: Yes   Cholesterol,Tot   Date Value Ref Range Status   07/26/2024 211 (H) 100 - 199 mg/dL Final     LDL   Date Value Ref Range Status   07/26/2024 see below <100 mg/dL Final     Comment:     The calculated LDL value is invalid due to the triglyceride  value of >400 mg/dL.       HDL   Date  Value Ref Range Status   07/26/2024 41 >=40 mg/dL Final     Triglycerides   Date Value Ref Range Status   07/26/2024 436 (H) 0 - 149 mg/dL Final     5. Family history of early Coronary Artery Disease in a first degree relative (Male less than 55 years of age; Female less than 65 years of age): Denies      Review of Systems:  Review of Systems   Constitutional:  Negative for malaise/fatigue.   Respiratory:  Negative for cough and shortness of breath.    Cardiovascular:  Positive for chest pain. Negative for palpitations, orthopnea, leg swelling and PND.   Musculoskeletal:  Negative for joint pain and myalgias.   Neurological:  Negative for dizziness and loss of consciousness.       Past Medical History:   Diagnosis Date    Hypertension          History reviewed. No pertinent surgical history.      Current Outpatient Medications   Medication Sig Dispense Refill    atorvastatin (LIPITOR) 40 MG Tab Take 1 Tablet by mouth every day. 90 Tablet 3    VENTOLIN  (90 Base) MCG/ACT Aero Soln inhalation aerosol INHALE 1 PUFF BY MOUTH EVERY 4 HOURS AS NEEDED FOR wheezing OR SHORTNESS OF BREATH      losartan (COZAAR) 25 MG Tab Take 25 mg by mouth 2 times a day.      aspirin EC (ECOTRIN) 81 MG Tablet Delayed Response Take 81 mg by mouth every day.       No current facility-administered medications for this visit.         No Known Allergies      Family History   Problem Relation Age of Onset    Heart Disease Mother          Social History     Socioeconomic History    Marital status:      Spouse name: Not on file    Number of children: Not on file    Years of education: Not on file    Highest education level: Not on file   Occupational History    Not on file   Tobacco Use    Smoking status: Former     Types: Cigarettes    Smokeless tobacco: Never   Substance and Sexual Activity    Alcohol use: No    Drug use: No    Sexual activity: Not on file   Other Topics Concern    Not on file   Social History Narrative    Not on  "file     Social Determinants of Health     Financial Resource Strain: Not on file   Food Insecurity: Not on file   Transportation Needs: Not on file   Physical Activity: Not on file   Stress: Not on file   Social Connections: Not on file   Intimate Partner Violence: Not on file   Housing Stability: Not on file         Physical Exam:  Ambulatory Vitals  /76 (BP Location: Left arm, Patient Position: Sitting, BP Cuff Size: Adult)   Pulse 79   Resp 18   Ht 1.753 m (5' 9\")   Wt (!) 127 kg (281 lb)   SpO2 96%    Oxygen Therapy:  Pulse Oximetry: 96 %  BP Readings from Last 4 Encounters:   08/08/24 128/76   05/01/23 130/86   03/14/18 158/98   03/07/18 158/98       Weight/BMI: Body mass index is 41.5 kg/m².  Wt Readings from Last 4 Encounters:   08/08/24 (!) 127 kg (281 lb)   05/01/23 115 kg (254 lb)   03/14/18 117 kg (258 lb)   03/07/18 117 kg (258 lb)         General: Not in acute distress, appears comfortable  HEENT: OP clear   Neck:  No carotid bruits, No JVD appreciated  CVS:  RRR, Normal S1, S2. No murmurs, rubs or gallops  Resp: Normal respiratory effort, lungs CTA bilaterally. No rales or rhonchi  Abdomen: Soft, non-distended, non-tender to palpation  Skin: No obvious rashes, no cyanosis  Neurological: Alert and oriented x3, moves all extremities, no focal neurologic deficits  Psychiatric: Appropriate affect  Extremities:   Extremities warm, no edema, pulses intact  MSK: Tenderness to palpation of left chest wall near the sternum.      Lab Data Review:  Lab Results   Component Value Date/Time    CHOLSTRLTOT 211 (H) 07/26/2024 02:26 PM    LDL see below 07/26/2024 02:26 PM    HDL 41 07/26/2024 02:26 PM    TRIGLYCERIDE 436 (H) 07/26/2024 02:26 PM       Lab Results   Component Value Date/Time    SODIUM 141 07/26/2024 02:26 PM    POTASSIUM 3.9 07/26/2024 02:26 PM    CHLORIDE 103 07/26/2024 02:26 PM    CO2 25 07/26/2024 02:26 PM    GLUCOSE 101 (H) 07/26/2024 02:26 PM    BUN 11 07/26/2024 02:26 PM    CREATININE " 0.94 07/26/2024 02:26 PM     Lab Results   Component Value Date/Time    ALKPHOSPHAT 79 07/26/2024 02:26 PM    ASTSGOT 42 07/26/2024 02:26 PM    ALTSGPT 35 07/26/2024 02:26 PM    TBILIRUBIN 0.3 07/26/2024 02:26 PM      Lab Results   Component Value Date/Time    WBC 10.2 07/26/2024 02:26 PM    HEMOGLOBIN 11.0 (L) 07/26/2024 02:26 PM     Lab Results   Component Value Date/Time    HBA1C 6.1 (H) 03/02/2016 01:21 PM         Cardiac Imaging and Procedures Review:    EKG dated 2018:   My personal interpretation is sinus rhythm    Echo dated 5/2/2023:   Normal left ventricular size and systolic function.  Normal right ventricular size and systolic function.  No hemodynamically significant valvular heart disease noted.    Cardiac Stress Test dated 5/10/2023:  Negative stress ECG for ischemia.    Good exercise tolerance at 8.9 METS.   No arrhythmias noted.      Assessment & Plan     1. Hypertriglyceridemia  atorvastatin (LIPITOR) 40 MG Tab      2. Mixed hyperlipidemia  Lipid Profile      3. Primary hypertension        4. Musculoskeletal chest pain              Medical Decision Making:  Mr. Danielito Marroquin is a 64-year-old man with past medical history significant for hypertension, dyslipidemia, obesity, prediabetes presents to the cardiology office for follow-up.    1. Hypertriglyceridemia  2. Mixed hyperlipidemia  -Review recent lipid panel shows a poorly controlled triglycerides.  LDL greater than goal of 100.  He has not been on lipid-lowering therapy over the past year.  He states that he ran out of refills and never asked for new prescription.  -Reviewed importance of good lipid control.  Explained that lifestyle modifications including diet and exercise play pivotal role in addition to medications.  We will restart him on atorvastatin 40 mg daily.  Refills sent to pharmacy.  -Check repeat lipid panel in 3 months.  If triglycerides are not at goal, can consider addition of Vascepa.    -Discussed lifestyle  modifications including exercise and dietary changes.  Recommend at least 150 minutes of moderate intensity exercise or 75 minutes of vigorous aerobic activity on a weekly basis.  Recommend dietary changes including incorporating heart healthy, AHA/Mediterranean diet.    3. Primary hypertension  -BP is acceptable and at goal.  Continue losartan 25 mg twice daily.  Uptitrate as needed if elevated in the future.    4. Musculoskeletal chest pain  -Noncardiac chest pain.  He has tenderness to palpation on exam.  This is MSK in etiology.  Recommend over-the-counter NSAIDs 3 to 5 days to help with symptoms.      It was a pleasure seeing Mr. Danielito Marroquin in the office today. Return in about 1 year (around 8/8/2025). Patient is aware to call the cardiology clinic with any questions or concerns.      Manuelito Simmons MD, [facility]  Cardiologist, [facility]  [facility], [facility]  [address] [address]  Phone: [phone]  Fax: [phone]    Please note that this dictation was created using voice recognition software. I have made every reasonable attempt to correct obvious errors, but it is possible there are errors of grammar and possibly content that I did not discover before finalizing the note.

## 2024-08-29 ENCOUNTER — HOSPITAL ENCOUNTER (OUTPATIENT)
Dept: LAB | Facility: MEDICAL CENTER | Age: 64
End: 2024-08-29
Attending: INTERNAL MEDICINE
Payer: COMMERCIAL

## 2024-08-29 DIAGNOSIS — E78.2 MIXED HYPERLIPIDEMIA: ICD-10-CM

## 2024-08-29 LAB
CHOLEST SERPL-MCNC: 132 MG/DL (ref 100–199)
HDLC SERPL-MCNC: 49 MG/DL
LDLC SERPL CALC-MCNC: 64 MG/DL
TRIGL SERPL-MCNC: 93 MG/DL (ref 0–149)

## 2024-08-29 PROCEDURE — 80061 LIPID PANEL: CPT

## 2024-08-29 PROCEDURE — 36415 COLL VENOUS BLD VENIPUNCTURE: CPT

## 2025-01-21 ENCOUNTER — HOSPITAL ENCOUNTER (OUTPATIENT)
Dept: LAB | Facility: MEDICAL CENTER | Age: 65
End: 2025-01-21
Attending: FAMILY MEDICINE
Payer: COMMERCIAL

## 2025-01-21 ENCOUNTER — HOSPITAL ENCOUNTER (OUTPATIENT)
Dept: RADIOLOGY | Facility: MEDICAL CENTER | Age: 65
End: 2025-01-21
Attending: FAMILY MEDICINE
Payer: COMMERCIAL

## 2025-01-21 DIAGNOSIS — J45.21 MILD INTERMITTENT ASTHMA WITH EXACERBATION: ICD-10-CM

## 2025-01-21 LAB
ALBUMIN SERPL BCP-MCNC: 4.2 G/DL (ref 3.2–4.9)
ALBUMIN/GLOB SERPL: 1.4 G/DL
ALP SERPL-CCNC: 70 U/L (ref 30–99)
ALT SERPL-CCNC: 24 U/L (ref 2–50)
ANION GAP SERPL CALC-SCNC: 11 MMOL/L (ref 7–16)
AST SERPL-CCNC: 31 U/L (ref 12–45)
BILIRUB SERPL-MCNC: 0.6 MG/DL (ref 0.1–1.5)
BUN SERPL-MCNC: 16 MG/DL (ref 8–22)
CALCIUM ALBUM COR SERPL-MCNC: 9.1 MG/DL (ref 8.5–10.5)
CALCIUM SERPL-MCNC: 9.3 MG/DL (ref 8.5–10.5)
CHLORIDE SERPL-SCNC: 103 MMOL/L (ref 96–112)
CHOLEST SERPL-MCNC: 134 MG/DL (ref 100–199)
CO2 SERPL-SCNC: 24 MMOL/L (ref 20–33)
CREAT SERPL-MCNC: 0.85 MG/DL (ref 0.5–1.4)
ERYTHROCYTE [DISTWIDTH] IN BLOOD BY AUTOMATED COUNT: 43.9 FL (ref 35.9–50)
GLOBULIN SER CALC-MCNC: 3.1 G/DL (ref 1.9–3.5)
GLUCOSE SERPL-MCNC: 110 MG/DL (ref 65–99)
HCT VFR BLD AUTO: 36.8 % (ref 42–52)
HCV AB SER QL: NORMAL
HDLC SERPL-MCNC: 52 MG/DL
HGB BLD-MCNC: 10.3 G/DL (ref 14–18)
HIV 1+2 AB+HIV1 P24 AG SERPL QL IA: NORMAL
LDLC SERPL CALC-MCNC: 65 MG/DL
MCH RBC QN AUTO: 18.8 PG (ref 27–33)
MCHC RBC AUTO-ENTMCNC: 28 G/DL (ref 32.3–36.5)
MCV RBC AUTO: 67.3 FL (ref 81.4–97.8)
PLATELET # BLD AUTO: 436 K/UL (ref 164–446)
PMV BLD AUTO: 10.4 FL (ref 9–12.9)
POTASSIUM SERPL-SCNC: 3.8 MMOL/L (ref 3.6–5.5)
PROT SERPL-MCNC: 7.3 G/DL (ref 6–8.2)
RBC # BLD AUTO: 5.47 M/UL (ref 4.7–6.1)
SODIUM SERPL-SCNC: 138 MMOL/L (ref 135–145)
TRIGL SERPL-MCNC: 84 MG/DL (ref 0–149)
WBC # BLD AUTO: 9.2 K/UL (ref 4.8–10.8)

## 2025-01-21 PROCEDURE — 86803 HEPATITIS C AB TEST: CPT

## 2025-01-21 PROCEDURE — 85027 COMPLETE CBC AUTOMATED: CPT

## 2025-01-21 PROCEDURE — 71046 X-RAY EXAM CHEST 2 VIEWS: CPT

## 2025-01-21 PROCEDURE — 80061 LIPID PANEL: CPT

## 2025-01-21 PROCEDURE — 80053 COMPREHEN METABOLIC PANEL: CPT

## 2025-01-21 PROCEDURE — 87389 HIV-1 AG W/HIV-1&-2 AB AG IA: CPT

## 2025-01-21 PROCEDURE — 36415 COLL VENOUS BLD VENIPUNCTURE: CPT

## 2025-01-28 ENCOUNTER — HOSPITAL ENCOUNTER (OUTPATIENT)
Dept: LAB | Facility: MEDICAL CENTER | Age: 65
End: 2025-01-28
Attending: FAMILY MEDICINE
Payer: COMMERCIAL

## 2025-01-28 LAB
ANISOCYTOSIS BLD QL SMEAR: ABNORMAL
BASOPHILS # BLD AUTO: 0 % (ref 0–1.8)
BASOPHILS # BLD: 0 K/UL (ref 0–0.12)
EOSINOPHIL # BLD AUTO: 0.83 K/UL (ref 0–0.51)
EOSINOPHIL NFR BLD: 6.9 % (ref 0–6.9)
ERYTHROCYTE [DISTWIDTH] IN BLOOD BY AUTOMATED COUNT: 44.1 FL (ref 35.9–50)
FERRITIN SERPL-MCNC: 7.8 NG/ML (ref 22–322)
FOLATE SERPL-MCNC: 14.9 NG/ML
HCT VFR BLD AUTO: 37.4 % (ref 42–52)
HGB BLD-MCNC: 10.6 G/DL (ref 14–18)
HYPOCHROMIA BLD QL SMEAR: ABNORMAL
IRON SATN MFR SERPL: 5 % (ref 15–55)
IRON SERPL-MCNC: 23 UG/DL (ref 50–180)
LYMPHOCYTES # BLD AUTO: 2.2 K/UL (ref 1–4.8)
LYMPHOCYTES NFR BLD: 18.3 % (ref 22–41)
MANUAL DIFF BLD: NORMAL
MCH RBC QN AUTO: 19.1 PG (ref 27–33)
MCHC RBC AUTO-ENTMCNC: 28.3 G/DL (ref 32.3–36.5)
MCV RBC AUTO: 67.4 FL (ref 81.4–97.8)
MICROCYTES BLD QL SMEAR: ABNORMAL
MONOCYTES # BLD AUTO: 0.84 K/UL (ref 0–0.85)
MONOCYTES NFR BLD AUTO: 7 % (ref 0–13.4)
NEUTROPHILS # BLD AUTO: 8.14 K/UL (ref 1.82–7.42)
NEUTROPHILS NFR BLD: 67.8 % (ref 44–72)
NRBC # BLD AUTO: 0 K/UL
NRBC BLD-RTO: 0 /100 WBC (ref 0–0.2)
OVALOCYTES BLD QL SMEAR: NORMAL
PLATELET # BLD AUTO: 418 K/UL (ref 164–446)
PLATELET BLD QL SMEAR: NORMAL
PMV BLD AUTO: 9.8 FL (ref 9–12.9)
POIKILOCYTOSIS BLD QL SMEAR: NORMAL
PSA SERPL DL<=0.01 NG/ML-MCNC: 0.82 NG/ML (ref 0–4)
RBC # BLD AUTO: 5.55 M/UL (ref 4.7–6.1)
RBC BLD AUTO: PRESENT
TIBC SERPL-MCNC: 422 UG/DL (ref 250–450)
TSH SERPL DL<=0.005 MIU/L-ACNC: 2.86 UIU/ML (ref 0.38–5.33)
UIBC SERPL-MCNC: 399 UG/DL (ref 110–370)
VIT B12 SERPL-MCNC: 305 PG/ML (ref 211–911)
WBC # BLD AUTO: 12 K/UL (ref 4.8–10.8)

## 2025-01-28 PROCEDURE — 84165 PROTEIN E-PHORESIS SERUM: CPT

## 2025-01-28 PROCEDURE — 85007 BL SMEAR W/DIFF WBC COUNT: CPT

## 2025-01-28 PROCEDURE — 83550 IRON BINDING TEST: CPT

## 2025-01-28 PROCEDURE — 36415 COLL VENOUS BLD VENIPUNCTURE: CPT

## 2025-01-28 PROCEDURE — 83540 ASSAY OF IRON: CPT

## 2025-01-28 PROCEDURE — 82607 VITAMIN B-12: CPT

## 2025-01-28 PROCEDURE — 82728 ASSAY OF FERRITIN: CPT

## 2025-01-28 PROCEDURE — 82746 ASSAY OF FOLIC ACID SERUM: CPT

## 2025-01-28 PROCEDURE — 84443 ASSAY THYROID STIM HORMONE: CPT

## 2025-01-28 PROCEDURE — 85027 COMPLETE CBC AUTOMATED: CPT

## 2025-01-28 PROCEDURE — 84153 ASSAY OF PSA TOTAL: CPT

## 2025-01-28 PROCEDURE — 84155 ASSAY OF PROTEIN SERUM: CPT

## 2025-01-30 LAB
ALBUMIN SERPL ELPH-MCNC: 3.94 G/DL (ref 3.75–5.01)
ALPHA1 GLOB SERPL ELPH-MCNC: 0.26 G/DL (ref 0.19–0.46)
ALPHA2 GLOB SERPL ELPH-MCNC: 0.9 G/DL (ref 0.48–1.05)
B-GLOBULIN SERPL ELPH-MCNC: 0.94 G/DL (ref 0.48–1.1)
GAMMA GLOB SERPL ELPH-MCNC: 1.07 G/DL (ref 0.62–1.51)
INTERPRETATION SERPL IFE-IMP: NORMAL
MONOCLON BAND OBS SERPL: NORMAL
MONOCLONAL PROTEIN NL11656: NORMAL G/DL
PATHOLOGY STUDY: NORMAL
PROT SERPL-MCNC: 7.1 G/DL (ref 6.3–8.2)

## 2025-03-06 ENCOUNTER — APPOINTMENT (OUTPATIENT)
Dept: RADIOLOGY | Facility: MEDICAL CENTER | Age: 65
End: 2025-03-06
Attending: FAMILY MEDICINE
Payer: COMMERCIAL

## 2025-03-06 DIAGNOSIS — D50.9 CHRONIC IRON DEFICIENCY ANEMIA: ICD-10-CM

## 2025-03-06 DIAGNOSIS — R05.3 CHRONIC COUGH: ICD-10-CM

## 2025-03-06 PROCEDURE — 71260 CT THORAX DX C+: CPT

## 2025-03-06 PROCEDURE — 700117 HCHG RX CONTRAST REV CODE 255

## 2025-03-06 RX ADMIN — IOHEXOL 100 ML: 350 INJECTION, SOLUTION INTRAVENOUS at 11:15

## 2025-03-06 RX ADMIN — IOHEXOL 25 ML: 240 INJECTION, SOLUTION INTRATHECAL; INTRAVASCULAR; INTRAVENOUS; ORAL at 11:15

## 2025-03-20 ENCOUNTER — HOSPITAL ENCOUNTER (OUTPATIENT)
Dept: LAB | Facility: MEDICAL CENTER | Age: 65
End: 2025-03-20
Attending: FAMILY MEDICINE
Payer: COMMERCIAL

## 2025-03-20 LAB
ALBUMIN SERPL BCP-MCNC: 4.3 G/DL (ref 3.2–4.9)
ALBUMIN/GLOB SERPL: 1.3 G/DL
ALP SERPL-CCNC: 74 U/L (ref 30–99)
ALT SERPL-CCNC: 33 U/L (ref 2–50)
ANION GAP SERPL CALC-SCNC: 12 MMOL/L (ref 7–16)
AST SERPL-CCNC: 32 U/L (ref 12–45)
BILIRUB SERPL-MCNC: 0.6 MG/DL (ref 0.1–1.5)
BUN SERPL-MCNC: 12 MG/DL (ref 8–22)
CALCIUM ALBUM COR SERPL-MCNC: 9.2 MG/DL (ref 8.5–10.5)
CALCIUM SERPL-MCNC: 9.4 MG/DL (ref 8.5–10.5)
CHLORIDE SERPL-SCNC: 103 MMOL/L (ref 96–112)
CO2 SERPL-SCNC: 23 MMOL/L (ref 20–33)
CREAT SERPL-MCNC: 0.86 MG/DL (ref 0.5–1.4)
FERRITIN SERPL-MCNC: 25.7 NG/ML (ref 22–322)
GLOBULIN SER CALC-MCNC: 3.2 G/DL (ref 1.9–3.5)
GLUCOSE SERPL-MCNC: 136 MG/DL (ref 65–99)
HCT VFR BLD AUTO: 43.9 % (ref 42–52)
HGB BLD-MCNC: 13 G/DL (ref 14–18)
IRON SATN MFR SERPL: 28 % (ref 15–55)
IRON SERPL-MCNC: 107 UG/DL (ref 50–180)
MCH RBC QN AUTO: 22.4 PG (ref 27–33)
MCHC RBC AUTO-ENTMCNC: 29.6 G/DL (ref 32.3–36.5)
MCV RBC AUTO: 75.7 FL (ref 81.4–97.8)
PLATELET # BLD AUTO: 291 K/UL (ref 164–446)
PMV BLD AUTO: 10.3 FL (ref 9–12.9)
POTASSIUM SERPL-SCNC: 4.1 MMOL/L (ref 3.6–5.5)
PROT SERPL-MCNC: 7.5 G/DL (ref 6–8.2)
RBC # BLD AUTO: 5.8 M/UL (ref 4.7–6.1)
SODIUM SERPL-SCNC: 138 MMOL/L (ref 135–145)
TIBC SERPL-MCNC: 378 UG/DL (ref 250–450)
UIBC SERPL-MCNC: 271 UG/DL (ref 110–370)
WBC # BLD AUTO: 7.8 K/UL (ref 4.8–10.8)

## 2025-03-20 PROCEDURE — 36415 COLL VENOUS BLD VENIPUNCTURE: CPT

## 2025-03-20 PROCEDURE — 83550 IRON BINDING TEST: CPT

## 2025-03-20 PROCEDURE — 83540 ASSAY OF IRON: CPT

## 2025-03-20 PROCEDURE — 82728 ASSAY OF FERRITIN: CPT

## 2025-03-20 PROCEDURE — 85027 COMPLETE CBC AUTOMATED: CPT

## 2025-03-20 PROCEDURE — 80053 COMPREHEN METABOLIC PANEL: CPT

## 2025-08-08 ENCOUNTER — HOSPITAL ENCOUNTER (OUTPATIENT)
Dept: LAB | Facility: MEDICAL CENTER | Age: 65
End: 2025-08-08
Payer: COMMERCIAL

## 2025-08-08 LAB
BASOPHILS # BLD AUTO: 0.8 % (ref 0–1.8)
BASOPHILS # BLD: 0.07 K/UL (ref 0–0.12)
EOSINOPHIL # BLD AUTO: 0.58 K/UL (ref 0–0.51)
EOSINOPHIL NFR BLD: 7 % (ref 0–6.9)
ERYTHROCYTE [DISTWIDTH] IN BLOOD BY AUTOMATED COUNT: 44.5 FL (ref 35.9–50)
FERRITIN SERPL-MCNC: 64 NG/ML (ref 22–322)
FOLATE SERPL-MCNC: 22.8 NG/ML
HCT VFR BLD AUTO: 45.2 % (ref 42–52)
HGB BLD-MCNC: 15 G/DL (ref 14–18)
IMM GRANULOCYTES # BLD AUTO: 0.02 K/UL (ref 0–0.11)
IMM GRANULOCYTES NFR BLD AUTO: 0.2 % (ref 0–0.9)
IRON SATN MFR SERPL: 33 % (ref 15–55)
IRON SERPL-MCNC: 105 UG/DL (ref 50–180)
LYMPHOCYTES # BLD AUTO: 2.63 K/UL (ref 1–4.8)
LYMPHOCYTES NFR BLD: 31.8 % (ref 22–41)
MCH RBC QN AUTO: 28.6 PG (ref 27–33)
MCHC RBC AUTO-ENTMCNC: 33.2 G/DL (ref 32.3–36.5)
MCV RBC AUTO: 86.1 FL (ref 81.4–97.8)
MONOCYTES # BLD AUTO: 0.66 K/UL (ref 0–0.85)
MONOCYTES NFR BLD AUTO: 8 % (ref 0–13.4)
NEUTROPHILS # BLD AUTO: 4.31 K/UL (ref 1.82–7.42)
NEUTROPHILS NFR BLD: 52.2 % (ref 44–72)
NRBC # BLD AUTO: 0 K/UL
NRBC BLD-RTO: 0 /100 WBC (ref 0–0.2)
PLATELET # BLD AUTO: 266 K/UL (ref 164–446)
PMV BLD AUTO: 10.2 FL (ref 9–12.9)
RBC # BLD AUTO: 5.25 M/UL (ref 4.7–6.1)
TIBC SERPL-MCNC: 318 UG/DL (ref 250–450)
TRANSFERRIN SERPL-MCNC: 263 MG/DL (ref 200–370)
UIBC SERPL-MCNC: 213 UG/DL (ref 110–370)
VIT B12 SERPL-MCNC: 352 PG/ML (ref 211–911)
WBC # BLD AUTO: 8.3 K/UL (ref 4.8–10.8)

## 2025-08-08 PROCEDURE — 82607 VITAMIN B-12: CPT

## 2025-08-08 PROCEDURE — 84466 ASSAY OF TRANSFERRIN: CPT

## 2025-08-08 PROCEDURE — 36415 COLL VENOUS BLD VENIPUNCTURE: CPT

## 2025-08-08 PROCEDURE — 86258 DGP ANTIBODY EACH IG CLASS: CPT

## 2025-08-08 PROCEDURE — 82746 ASSAY OF FOLIC ACID SERUM: CPT

## 2025-08-08 PROCEDURE — 83540 ASSAY OF IRON: CPT

## 2025-08-08 PROCEDURE — 82728 ASSAY OF FERRITIN: CPT

## 2025-08-08 PROCEDURE — 83550 IRON BINDING TEST: CPT

## 2025-08-08 PROCEDURE — 82784 ASSAY IGA/IGD/IGG/IGM EACH: CPT

## 2025-08-08 PROCEDURE — 85025 COMPLETE CBC W/AUTO DIFF WBC: CPT

## 2025-08-08 PROCEDURE — 86364 TISS TRNSGLTMNASE EA IG CLAS: CPT

## 2025-08-10 LAB
GLIADIN IGA SER IA-ACNC: <0.72 FLU (ref 0–4.99)
IGA SERPL-MCNC: 288 MG/DL (ref 68–408)
TTG IGA SER IA-ACNC: <1.02 FLU (ref 0–4.99)